# Patient Record
Sex: FEMALE | Race: WHITE | NOT HISPANIC OR LATINO | Employment: FULL TIME | URBAN - METROPOLITAN AREA
[De-identification: names, ages, dates, MRNs, and addresses within clinical notes are randomized per-mention and may not be internally consistent; named-entity substitution may affect disease eponyms.]

---

## 2018-01-18 ENCOUNTER — OFFICE VISIT (OUTPATIENT)
Dept: INTERNAL MEDICINE | Facility: CLINIC | Age: 23
End: 2018-01-18

## 2018-01-18 VITALS
WEIGHT: 224 LBS | HEART RATE: 100 BPM | BODY MASS INDEX: 37.28 KG/M2 | DIASTOLIC BLOOD PRESSURE: 68 MMHG | SYSTOLIC BLOOD PRESSURE: 124 MMHG | TEMPERATURE: 98.5 F | OXYGEN SATURATION: 99 %

## 2018-01-18 DIAGNOSIS — J06.9 ACUTE URI: Primary | ICD-10-CM

## 2018-01-18 DIAGNOSIS — R68.89 FLU-LIKE SYMPTOMS: ICD-10-CM

## 2018-01-18 LAB
EXPIRATION DATE: NORMAL
EXPIRATION DATE: NORMAL
FLUAV AG NPH QL: NEGATIVE
FLUBV AG NPH QL: NEGATIVE
INTERNAL CONTROL: NORMAL
INTERNAL CONTROL: NORMAL
Lab: NORMAL
Lab: NORMAL
S PYO AG THROAT QL: NEGATIVE

## 2018-01-18 PROCEDURE — 87804 INFLUENZA ASSAY W/OPTIC: CPT | Performed by: PHYSICIAN ASSISTANT

## 2018-01-18 PROCEDURE — 87880 STREP A ASSAY W/OPTIC: CPT | Performed by: PHYSICIAN ASSISTANT

## 2018-01-18 PROCEDURE — 99213 OFFICE O/P EST LOW 20 MIN: CPT | Performed by: PHYSICIAN ASSISTANT

## 2018-01-18 NOTE — PROGRESS NOTES
Chief Complaint   Patient presents with   • Sore throat, body aches, low grade fever, congestion x2 days       Subjective   Kindra Combs is a 22 y.o. female.       History of Present Illness     Pt woke up yesterday with nasal congestion. Started taking claritin and sudafed, felt better through the day. This morning she woke up feeling worse. Head is congested, ears are full, sore throat. Mild cough from post nasal drainage. Felt chilled this morning, did not check her temp. Feels achy. Friend with stomach bug and several people at work with flu diagnosis. Did not have flu shot.      Current Outpatient Prescriptions:   •  QUASENSE 0.15-0.03 MG per tablet, Take 1 tablet by mouth Daily., Disp: 28 tablet, Rfl: 5     PMFSH  The following portions of the patient's history were reviewed and updated as appropriate: allergies, current medications, past family history, past medical history, past social history, past surgical history and problem list.    Review of Systems   Constitutional: Positive for fatigue. Negative for activity change and unexpected weight change.   HENT: Positive for congestion, postnasal drip and sore throat. Negative for ear pain.    Eyes: Negative for pain and discharge.   Respiratory: Positive for cough. Negative for chest tightness, shortness of breath and wheezing.    Cardiovascular: Negative for chest pain and palpitations.   Gastrointestinal: Negative for abdominal pain, diarrhea and vomiting.   Endocrine: Negative.    Genitourinary: Negative.    Musculoskeletal: Negative for joint swelling.   Skin: Negative for color change, rash and wound.   Allergic/Immunologic: Negative.    Neurological: Negative for seizures and syncope.   Psychiatric/Behavioral: Negative.        Objective   /68  Pulse 100  Temp 98.5 °F (36.9 °C)  Wt 102 kg (224 lb)  SpO2 99%  BMI 37.28 kg/m2    Physical Exam   Constitutional: She is oriented to person, place, and time. She appears well-developed and  well-nourished.  Non-toxic appearance. No distress.   HENT:   Head: Normocephalic and atraumatic. Hair is normal.   Right Ear: External ear normal. No drainage, swelling or tenderness. Tympanic membrane is retracted.   Left Ear: External ear normal. No drainage, swelling or tenderness. Tympanic membrane is retracted.   Nose: Mucosal edema present. No epistaxis.   Mouth/Throat: Uvula is midline and mucous membranes are normal. No oral lesions. No uvula swelling. Posterior oropharyngeal erythema present. No oropharyngeal exudate.   Eyes: Conjunctivae and EOM are normal. Pupils are equal, round, and reactive to light. Right eye exhibits no discharge. Left eye exhibits no discharge. No scleral icterus.   Neck: Normal range of motion. Neck supple.   Cardiovascular: Normal rate, regular rhythm and normal heart sounds.  Exam reveals no gallop.    No murmur heard.  Pulmonary/Chest: Breath sounds normal. No stridor. No respiratory distress. She has no wheezes. She has no rales. She exhibits no tenderness.   Abdominal: Soft. There is no tenderness.   Lymphadenopathy:     She has cervical adenopathy.   Neurological: She is alert and oriented to person, place, and time. She exhibits normal muscle tone.   Skin: Skin is warm and dry. No rash noted. She is not diaphoretic.   Psychiatric: She has a normal mood and affect. Her behavior is normal. Judgment and thought content normal.   Nursing note and vitals reviewed.      Results for orders placed or performed in visit on 01/18/18   POCT Influenza A/B   Result Value Ref Range    Rapid Influenza A Ag Negative     Rapid Influenza B Ag Negative     Internal Control Passed Passed    Lot Number [id number]     Expiration Date 05/14/2020    POCT rapid strep A   Result Value Ref Range    Rapid Strep A Screen Negative Negative, VALID, INVALID, Not Performed    Internal Control Passed Passed    Lot Number [id number]     Expiration Date 02/28/2019         ASSESSMENT/PLAN    Problem List Items  Addressed This Visit     None      Visit Diagnoses     Acute URI    -  Primary    Use otc symptomatic care. Increase rest and fluids. Off work and school for the next 2 days. RTC if worsening or no better.    Relevant Orders    POCT rapid strep A (Completed)    Flu-like symptoms        Relevant Orders    POCT Influenza A/B (Completed)               Return if symptoms worsen or fail to improve.

## 2018-03-19 ENCOUNTER — OFFICE VISIT (OUTPATIENT)
Dept: INTERNAL MEDICINE | Facility: CLINIC | Age: 23
End: 2018-03-19

## 2018-03-19 VITALS
WEIGHT: 218 LBS | OXYGEN SATURATION: 99 % | SYSTOLIC BLOOD PRESSURE: 128 MMHG | HEART RATE: 65 BPM | BODY MASS INDEX: 36.28 KG/M2 | DIASTOLIC BLOOD PRESSURE: 76 MMHG

## 2018-03-19 DIAGNOSIS — K90.49 MILK INTOLERANCE: ICD-10-CM

## 2018-03-19 DIAGNOSIS — Z91.018 MULTIPLE FOOD ALLERGIES: ICD-10-CM

## 2018-03-19 DIAGNOSIS — Z00.00 ENCOUNTER FOR HEALTH MAINTENANCE EXAMINATION: Primary | ICD-10-CM

## 2018-03-19 LAB
ALBUMIN SERPL-MCNC: 3.9 G/DL (ref 3.2–4.8)
ALBUMIN/GLOB SERPL: 1.4 G/DL (ref 1.5–2.5)
ALP SERPL-CCNC: 70 U/L (ref 25–100)
ALT SERPL W P-5'-P-CCNC: 20 U/L (ref 7–40)
ANION GAP SERPL CALCULATED.3IONS-SCNC: 7 MMOL/L (ref 3–11)
AST SERPL-CCNC: 23 U/L (ref 0–33)
BILIRUB SERPL-MCNC: 0.2 MG/DL (ref 0.3–1.2)
BUN BLD-MCNC: 15 MG/DL (ref 9–23)
BUN/CREAT SERPL: 15 (ref 7–25)
CALCIUM SPEC-SCNC: 8.7 MG/DL (ref 8.7–10.4)
CHLORIDE SERPL-SCNC: 105 MMOL/L (ref 99–109)
CO2 SERPL-SCNC: 28 MMOL/L (ref 20–31)
CREAT BLD-MCNC: 1 MG/DL (ref 0.6–1.3)
DEPRECATED RDW RBC AUTO: 43.4 FL (ref 37–54)
ERYTHROCYTE [DISTWIDTH] IN BLOOD BY AUTOMATED COUNT: 12.6 % (ref 11.3–14.5)
GFR SERPL CREATININE-BSD FRML MDRD: 69 ML/MIN/1.73
GLOBULIN UR ELPH-MCNC: 2.7 GM/DL
GLUCOSE BLD-MCNC: 88 MG/DL (ref 70–100)
HCT VFR BLD AUTO: 43 % (ref 34.5–44)
HGB BLD-MCNC: 14 G/DL (ref 11.5–15.5)
MCH RBC QN AUTO: 30.6 PG (ref 27–31)
MCHC RBC AUTO-ENTMCNC: 32.6 G/DL (ref 32–36)
MCV RBC AUTO: 93.9 FL (ref 80–99)
PLATELET # BLD AUTO: 293 10*3/MM3 (ref 150–450)
PMV BLD AUTO: 11.6 FL (ref 6–12)
POTASSIUM BLD-SCNC: 4.1 MMOL/L (ref 3.5–5.5)
PROT SERPL-MCNC: 6.6 G/DL (ref 5.7–8.2)
RBC # BLD AUTO: 4.58 10*6/MM3 (ref 3.89–5.14)
SODIUM BLD-SCNC: 140 MMOL/L (ref 132–146)
TSH SERPL DL<=0.05 MIU/L-ACNC: 2.6 MIU/ML (ref 0.35–5.35)
WBC NRBC COR # BLD: 9.17 10*3/MM3 (ref 3.5–10.8)

## 2018-03-19 PROCEDURE — 85027 COMPLETE CBC AUTOMATED: CPT | Performed by: NURSE PRACTITIONER

## 2018-03-19 PROCEDURE — 99395 PREV VISIT EST AGE 18-39: CPT | Performed by: NURSE PRACTITIONER

## 2018-03-19 PROCEDURE — 80053 COMPREHEN METABOLIC PANEL: CPT | Performed by: NURSE PRACTITIONER

## 2018-03-19 PROCEDURE — 84443 ASSAY THYROID STIM HORMONE: CPT | Performed by: NURSE PRACTITIONER

## 2018-03-19 NOTE — PROGRESS NOTES
Subjective  Annual Exam      Kindra Combs is a 22 y.o. female.   Allergies   Allergen Reactions   • Sulfa Antibiotics      History of Present Illness      Working out 4-5 days a week since 10/16, cleaned up her diet, has lost body mass but not lbs , would like to talk to nutritionist about this  Has also been adding almond milk and whey protein to her diet, after consumption she has n/v and feels bad, would like to get tested for this   The following portions of the patient's history were reviewed and updated as appropriate: allergies, past family history, past surgical history and problem list.    Review of Systems   Constitutional: Negative for appetite change, fever and unexpected weight change.   HENT: Negative for ear pain, facial swelling and sore throat.    Eyes: Negative for pain and visual disturbance.   Respiratory: Negative for chest tightness, shortness of breath and wheezing.    Cardiovascular: Negative for chest pain and palpitations.   Gastrointestinal: Negative for abdominal pain and blood in stool.   Endocrine: Negative.    Genitourinary: Negative for difficulty urinating and hematuria.   Musculoskeletal: Negative for joint swelling.   Neurological: Negative for tremors, seizures and syncope.   Hematological: Negative for adenopathy.   Psychiatric/Behavioral: Negative.    All other systems reviewed and are negative.      Objective   Physical Exam   Constitutional: She is oriented to person, place, and time. She appears well-developed and well-nourished. No distress.   HENT:   Head: Normocephalic and atraumatic. Hair is normal.   Right Ear: Hearing, tympanic membrane, external ear and ear canal normal. No drainage. No decreased hearing is noted.   Left Ear: Hearing, tympanic membrane, external ear and ear canal normal. No decreased hearing is noted.   Nose: No nasal deformity.   Mouth/Throat: Oropharynx is clear and moist.   Eyes: Conjunctivae, EOM and lids are normal. Pupils are equal, round, and  reactive to light. Lids are everted and swept, no foreign bodies found. Right eye exhibits no discharge. Left eye exhibits no discharge.   Fundoscopic exam:       The right eye shows red reflex.        The left eye shows red reflex.   Neck: Normal range of motion. Neck supple. No JVD present. No tracheal deviation present. No thyromegaly present.   Cardiovascular: Normal rate, regular rhythm, normal heart sounds, intact distal pulses and normal pulses.  Exam reveals no gallop and no friction rub.    No murmur heard.  Pulmonary/Chest: Effort normal and breath sounds normal. No respiratory distress. She has no wheezes. She has no rales. She exhibits no mass and no tenderness. Right breast exhibits no inverted nipple, no mass and no nipple discharge. Left breast exhibits no inverted nipple, no mass and no nipple discharge.   Abdominal: Soft. Bowel sounds are normal. She exhibits no distension and no mass. There is no tenderness. There is no rebound and no guarding. No hernia.   Musculoskeletal: Normal range of motion. She exhibits no edema, tenderness or deformity.   Lymphadenopathy:     She has no cervical adenopathy.        Right: No inguinal adenopathy present.        Left: No inguinal adenopathy present.   Neurological: She is alert and oriented to person, place, and time. She has normal reflexes. She displays normal reflexes. No cranial nerve deficit. She exhibits normal muscle tone. Coordination normal.   Skin: Skin is warm and dry. No rash noted. She is not diaphoretic. No erythema.   Psychiatric: She has a normal mood and affect. Her behavior is normal. Judgment and thought content normal.   Nursing note and vitals reviewed.      Assessment/Plan     Problem List Items Addressed This Visit     None      Visit Diagnoses     Encounter for health maintenance examination    -  Primary    Relevant Orders    CBC (No Diff)    Comprehensive Metabolic Panel    TSH    Milk intolerance        Relevant Orders    Ambulatory  Referral to Allergy    Multiple food allergies        Relevant Orders    Ambulatory Referral to Allergy        Counseled patient regarding multimodal approach with healthy nutrition, healthy sleep, regular physical activity, social activities, counseling, and medications.  Reviewed medication     rtc 1 yr, will review labs and send to pt with poc

## 2018-07-13 ENCOUNTER — OFFICE VISIT (OUTPATIENT)
Dept: INTERNAL MEDICINE | Facility: CLINIC | Age: 23
End: 2018-07-13

## 2018-07-13 VITALS
DIASTOLIC BLOOD PRESSURE: 78 MMHG | BODY MASS INDEX: 36.15 KG/M2 | WEIGHT: 217 LBS | SYSTOLIC BLOOD PRESSURE: 124 MMHG | OXYGEN SATURATION: 99 % | HEART RATE: 52 BPM | HEIGHT: 65 IN

## 2018-07-13 DIAGNOSIS — F41.1 GAD (GENERALIZED ANXIETY DISORDER): Primary | ICD-10-CM

## 2018-07-13 PROCEDURE — 99213 OFFICE O/P EST LOW 20 MIN: CPT | Performed by: NURSE PRACTITIONER

## 2018-07-13 RX ORDER — CITALOPRAM 10 MG/1
10 TABLET ORAL DAILY
Qty: 30 TABLET | Refills: 1 | Status: SHIPPED | OUTPATIENT
Start: 2018-07-13 | End: 2018-08-28 | Stop reason: SDUPTHER

## 2018-07-13 RX ORDER — BUSPIRONE HYDROCHLORIDE 5 MG/1
5 TABLET ORAL 2 TIMES DAILY
Qty: 60 TABLET | Refills: 1 | Status: SHIPPED | OUTPATIENT
Start: 2018-07-13 | End: 2018-08-28 | Stop reason: SDUPTHER

## 2018-07-13 RX ORDER — PROPRANOLOL HYDROCHLORIDE 10 MG/1
10 TABLET ORAL 2 TIMES DAILY PRN
Qty: 42 TABLET | Refills: 1 | Status: SHIPPED | OUTPATIENT
Start: 2018-07-13

## 2018-07-13 NOTE — PATIENT INSTRUCTIONS
Generalized Anxiety Disorder, Adult  Generalized anxiety disorder (MARIEL) is a mental health disorder. People with this condition constantly worry about everyday events. Unlike normal anxiety, worry related to MARIEL is not triggered by a specific event. These worries also do not fade or get better with time. MARIEL interferes with life functions, including relationships, work, and school.  MARIEL can vary from mild to severe. People with severe MARIEL can have intense waves of anxiety with physical symptoms (panic attacks).  What are the causes?  The exact cause of MARIEL is not known.  What increases the risk?  This condition is more likely to develop in:  · Women.  · People who have a family history of anxiety disorders.  · People who are very shy.  · People who experience very stressful life events, such as the death of a loved one.  · People who have a very stressful family environment.    What are the signs or symptoms?  People with MARIEL often worry excessively about many things in their lives, such as their health and family. They may also be overly concerned about:  · Doing well at work.  · Being on time.  · Natural disasters.  · Friendships.    Physical symptoms of MARIEL include:  · Fatigue.  · Muscle tension or having muscle twitches.  · Trembling or feeling shaky.  · Being easily startled.  · Feeling like your heart is pounding or racing.  · Feeling out of breath or like you cannot take a deep breath.  · Having trouble falling asleep or staying asleep.  · Sweating.  · Nausea, diarrhea, or irritable bowel syndrome (IBS).  · Headaches.  · Trouble concentrating or remembering facts.  · Restlessness.  · Irritability.    How is this diagnosed?  Your health care provider can diagnose MARIEL based on your symptoms and medical history. You will also have a physical exam. The health care provider will ask specific questions about your symptoms, including how severe they are, when they started, and if they come and go. Your health care  provider may ask you about your use of alcohol or drugs, including prescription medicines. Your health care provider may refer you to a mental health specialist for further evaluation.  Your health care provider will do a thorough examination and may perform additional tests to rule out other possible causes of your symptoms.  To be diagnosed with MARIEL, a person must have anxiety that:  · Is out of his or her control.  · Affects several different aspects of his or her life, such as work and relationships.  · Causes distress that makes him or her unable to take part in normal activities.  · Includes at least three physical symptoms of MARIEL, such as restlessness, fatigue, trouble concentrating, irritability, muscle tension, or sleep problems.    Before your health care provider can confirm a diagnosis of MARIEL, these symptoms must be present more days than they are not, and they must last for six months or longer.  How is this treated?  The following therapies are usually used to treat MARIEL:  · Medicine. Antidepressant medicine is usually prescribed for long-term daily control. Antianxiety medicines may be added in severe cases, especially when panic attacks occur.  · Talk therapy (psychotherapy). Certain types of talk therapy can be helpful in treating MARIEL by providing support, education, and guidance. Options include:  ? Cognitive behavioral therapy (CBT). People learn coping skills and techniques to ease their anxiety. They learn to identify unrealistic or negative thoughts and behaviors and to replace them with positive ones.  ? Acceptance and commitment therapy (ACT). This treatment teaches people how to be mindful as a way to cope with unwanted thoughts and feelings.  ? Biofeedback. This process trains you to manage your body's response (physiological response) through breathing techniques and relaxation methods. You will work with a therapist while machines are used to monitor your physical symptoms.  · Stress  management techniques. These include yoga, meditation, and exercise.    A mental health specialist can help determine which treatment is best for you. Some people see improvement with one type of therapy. However, other people require a combination of therapies.  Follow these instructions at home:  · Take over-the-counter and prescription medicines only as told by your health care provider.  · Try to maintain a normal routine.  · Try to anticipate stressful situations and allow extra time to manage them.  · Practice any stress management or self-calming techniques as taught by your health care provider.  · Do not punish yourself for setbacks or for not making progress.  · Try to recognize your accomplishments, even if they are small.  · Keep all follow-up visits as told by your health care provider. This is important.  Contact a health care provider if:  · Your symptoms do not get better.  · Your symptoms get worse.  · You have signs of depression, such as:  ? A persistently sad, cranky, or irritable mood.  ? Loss of enjoyment in activities that used to bring you saad.  ? Change in weight or eating.  ? Changes in sleeping habits.  ? Avoiding friends or family members.  ? Loss of energy for normal tasks.  ? Feelings of guilt or worthlessness.  Get help right away if:  · You have serious thoughts about hurting yourself or others.  If you ever feel like you may hurt yourself or others, or have thoughts about taking your own life, get help right away. You can go to your nearest emergency department or call:  · Your local emergency services (911 in the [place]  · A suicide crisis helpline, such as the National Suicide Prevention Lifeline at 1-494.429.2146. This is open 24 hours a day.    Summary  · Generalized anxiety disorder (MARIEL) is a mental health disorder that involves worry that is not triggered by a specific event.  · People with MARIEL often worry excessively about many things in their lives, such as their health and  family.  · MARIEL may cause physical symptoms such as restlessness, trouble concentrating, sleep problems, frequent sweating, nausea, diarrhea, headaches, and trembling or muscle twitching.  · A mental health specialist can help determine which treatment is best for you. Some people see improvement with one type of therapy. However, other people require a combination of therapies.  This information is not intended to replace advice given to you by your health care provider. Make sure you discuss any questions you have with your health care provider.  Document Released: 04/14/2014 Document Revised: 11/07/2017 Document Reviewed: 11/07/2017  [employer] Patient Education © 2018 [employer]      Recommend counseling

## 2018-07-13 NOTE — PROGRESS NOTES
"Subjective   Kindra Combs is a 23 y.o. female.   Chief Complaint   Patient presents with   • Anxiety     patient used to be on anxiety medication but she stopped taking them, the last 2-3 months she has been very anxious, sleeping alot, no energy, panick attacks(2) since may.       History of Present Illness As above.. HO anxiety-used buspirone and propranolol   Not really sad. No particular trigger for anxiety.  Denies daily headaches, ear pain, sore throat, shortness of air, chest pain, abdominal pain, nausea vomiting or diarrhea.  She is eating and drinking okay.  She has a menstrual period every 3 months.    The following portions of the patient's history were reviewed and updated as appropriate: allergies, current medications, past family history, past medical history, past social history, past surgical history and problem list.    Current Outpatient Prescriptions:   •  QUASENSE 0.15-0.03 MG per tablet, Take 1 tablet by mouth Daily., Disp: 91 tablet, Rfl: 3  •  busPIRone (BUSPAR) 5 MG tablet, Take 1 tablet by mouth 2 (Two) Times a Day., Disp: 60 tablet, Rfl: 1  •  citalopram (CELEXA) 10 MG tablet, Take 1 tablet by mouth Daily., Disp: 30 tablet, Rfl: 1  •  propranolol (INDERAL) 10 MG tablet, Take 1 tablet by mouth 2 (Two) Times a Day As Needed (anxiety)., Disp: 42 tablet, Rfl: 1    Review of Systems Consitutional, HEENT, Respiratory, CV, GI, , Skin, Musculoskeletal, Neuro-mental, Endocrinological, Hematological were reviewed.  Positives were discussed in the HPI, otherwise ROS was negative   /78   Pulse 52   Ht 165.1 cm (65\")   Wt 98.4 kg (217 lb)   SpO2 99%   BMI 36.11 kg/m²     Objective   Allergies   Allergen Reactions   • Milk-Related Compounds Unknown (See Comments)     Upset stomach   • Sulfa Antibiotics        Physical Exam   Constitutional: She appears well-developed and well-nourished. No distress.   HENT:   Head: Normocephalic.   Right Ear: External ear normal.   Left Ear: External ear " normal.   Nose: Nose normal.   Mouth/Throat: Oropharynx is clear and moist.   Eyes: Left eye exhibits no discharge.   Neck: Neck supple. No thyromegaly present.   Cardiovascular: Normal rate, regular rhythm, normal heart sounds and intact distal pulses.  Exam reveals no gallop and no friction rub.    No murmur heard.  Pulmonary/Chest: Effort normal and breath sounds normal. No respiratory distress.   Abdominal: Soft. There is no tenderness.   Lymphadenopathy:     She has no cervical adenopathy.   Skin: Skin is warm and dry.   Color pink   Psychiatric: She has a normal mood and affect. Her behavior is normal. Judgment and thought content normal.   Nursing note and vitals reviewed.      Procedures    Assessment/Plan   Kindra was seen today for anxiety.    Diagnoses and all orders for this visit:    MARIEL (generalized anxiety disorder)  -     citalopram (CELEXA) 10 MG tablet; Take 1 tablet by mouth Daily.  -     busPIRone (BUSPAR) 5 MG tablet; Take 1 tablet by mouth 2 (Two) Times a Day.  -     propranolol (INDERAL) 10 MG tablet; Take 1 tablet by mouth 2 (Two) Times a Day As Needed (anxiety).        .    Patient Instructions   Generalized Anxiety Disorder, Adult  Generalized anxiety disorder (MARIEL) is a mental health disorder. People with this condition constantly worry about everyday events. Unlike normal anxiety, worry related to MARIEL is not triggered by a specific event. These worries also do not fade or get better with time. MARIEL interferes with life functions, including relationships, work, and school.  MARIEL can vary from mild to severe. People with severe MARIEL can have intense waves of anxiety with physical symptoms (panic attacks).  What are the causes?  The exact cause of MARIEL is not known.  What increases the risk?  This condition is more likely to develop in:  · Women.  · People who have a family history of anxiety disorders.  · People who are very shy.  · People who experience very stressful life events, such as the  death of a loved one.  · People who have a very stressful family environment.    What are the signs or symptoms?  People with MARIEL often worry excessively about many things in their lives, such as their health and family. They may also be overly concerned about:  · Doing well at work.  · Being on time.  · Natural disasters.  · Friendships.    Physical symptoms of MARIEL include:  · Fatigue.  · Muscle tension or having muscle twitches.  · Trembling or feeling shaky.  · Being easily startled.  · Feeling like your heart is pounding or racing.  · Feeling out of breath or like you cannot take a deep breath.  · Having trouble falling asleep or staying asleep.  · Sweating.  · Nausea, diarrhea, or irritable bowel syndrome (IBS).  · Headaches.  · Trouble concentrating or remembering facts.  · Restlessness.  · Irritability.    How is this diagnosed?  Your health care provider can diagnose MARIEL based on your symptoms and medical history. You will also have a physical exam. The health care provider will ask specific questions about your symptoms, including how severe they are, when they started, and if they come and go. Your health care provider may ask you about your use of alcohol or drugs, including prescription medicines. Your health care provider may refer you to a mental health specialist for further evaluation.  Your health care provider will do a thorough examination and may perform additional tests to rule out other possible causes of your symptoms.  To be diagnosed with MARIEL, a person must have anxiety that:  · Is out of his or her control.  · Affects several different aspects of his or her life, such as work and relationships.  · Causes distress that makes him or her unable to take part in normal activities.  · Includes at least three physical symptoms of MARIEL, such as restlessness, fatigue, trouble concentrating, irritability, muscle tension, or sleep problems.    Before your health care provider can confirm a diagnosis of  MARIEL, these symptoms must be present more days than they are not, and they must last for six months or longer.  How is this treated?  The following therapies are usually used to treat MARIEL:  · Medicine. Antidepressant medicine is usually prescribed for long-term daily control. Antianxiety medicines may be added in severe cases, especially when panic attacks occur.  · Talk therapy (psychotherapy). Certain types of talk therapy can be helpful in treating MARIEL by providing support, education, and guidance. Options include:  ? Cognitive behavioral therapy (CBT). People learn coping skills and techniques to ease their anxiety. They learn to identify unrealistic or negative thoughts and behaviors and to replace them with positive ones.  ? Acceptance and commitment therapy (ACT). This treatment teaches people how to be mindful as a way to cope with unwanted thoughts and feelings.  ? Biofeedback. This process trains you to manage your body's response (physiological response) through breathing techniques and relaxation methods. You will work with a therapist while machines are used to monitor your physical symptoms.  · Stress management techniques. These include yoga, meditation, and exercise.    A mental health specialist can help determine which treatment is best for you. Some people see improvement with one type of therapy. However, other people require a combination of therapies.  Follow these instructions at home:  · Take over-the-counter and prescription medicines only as told by your health care provider.  · Try to maintain a normal routine.  · Try to anticipate stressful situations and allow extra time to manage them.  · Practice any stress management or self-calming techniques as taught by your health care provider.  · Do not punish yourself for setbacks or for not making progress.  · Try to recognize your accomplishments, even if they are small.  · Keep all follow-up visits as told by your health care provider. This  is important.  Contact a health care provider if:  · Your symptoms do not get better.  · Your symptoms get worse.  · You have signs of depression, such as:  ? A persistently sad, cranky, or irritable mood.  ? Loss of enjoyment in activities that used to bring you saad.  ? Change in weight or eating.  ? Changes in sleeping habits.  ? Avoiding friends or family members.  ? Loss of energy for normal tasks.  ? Feelings of guilt or worthlessness.  Get help right away if:  · You have serious thoughts about hurting yourself or others.  If you ever feel like you may hurt yourself or others, or have thoughts about taking your own life, get help right away. You can go to your nearest emergency department or call:  · Your local emergency services (911 in the [place]  · A suicide crisis helpline, such as the National Suicide Prevention Lifeline at 1-723.908.8763. This is open 24 hours a day.    Summary  · Generalized anxiety disorder (MARIEL) is a mental health disorder that involves worry that is not triggered by a specific event.  · People with MARIEL often worry excessively about many things in their lives, such as their health and family.  · MARIEL may cause physical symptoms such as restlessness, trouble concentrating, sleep problems, frequent sweating, nausea, diarrhea, headaches, and trembling or muscle twitching.  · A mental health specialist can help determine which treatment is best for you. Some people see improvement with one type of therapy. However, other people require a combination of therapies.  This information is not intended to replace advice given to you by your health care provider. Make sure you discuss any questions you have with your health care provider.  Document Released: 04/14/2014 Document Revised: 11/07/2017 Document Reviewed: 11/07/2017  [employer] Patient Education © 2018 [employer]      Recommend counseling        ZOEY Riggs

## 2018-08-28 DIAGNOSIS — F41.1 GAD (GENERALIZED ANXIETY DISORDER): ICD-10-CM

## 2018-08-28 RX ORDER — CITALOPRAM 10 MG/1
10 TABLET ORAL DAILY
Qty: 30 TABLET | Refills: 1 | Status: SHIPPED | OUTPATIENT
Start: 2018-08-28

## 2018-08-28 RX ORDER — BUSPIRONE HYDROCHLORIDE 5 MG/1
TABLET ORAL
Qty: 60 TABLET | Refills: 0 | OUTPATIENT
Start: 2018-08-28

## 2018-08-28 RX ORDER — CITALOPRAM 10 MG/1
10 TABLET ORAL DAILY
Qty: 30 TABLET | Refills: 0 | OUTPATIENT
Start: 2018-08-28

## 2018-08-28 RX ORDER — BUSPIRONE HYDROCHLORIDE 5 MG/1
5 TABLET ORAL 2 TIMES DAILY
Qty: 60 TABLET | Refills: 1 | Status: SHIPPED | OUTPATIENT
Start: 2018-08-28

## 2019-03-22 RX ORDER — LEVONORGESTREL / ETHINYL ESTRADIOL 0.15-0.03
KIT ORAL
Qty: 91 TABLET | Refills: 0 | Status: CANCELLED | OUTPATIENT
Start: 2019-03-22